# Patient Record
Sex: MALE | Race: WHITE | Employment: OTHER | URBAN - METROPOLITAN AREA
[De-identification: names, ages, dates, MRNs, and addresses within clinical notes are randomized per-mention and may not be internally consistent; named-entity substitution may affect disease eponyms.]

---

## 2018-11-09 ENCOUNTER — TELEPHONE (OUTPATIENT)
Dept: FAMILY MEDICINE CLINIC | Age: 58
End: 2018-11-09

## 2018-11-27 NOTE — TELEPHONE ENCOUNTER
Patient wife again called stating needs this form, patient hasnt been seen since 2015. States  had been diagnosed with Prostate Cancer. I tried to call the number listed and wasn't able to contact anyone by phone. I did leave a message stating that we havent seen this patient since 2015, and if a form needed signed they need to get from their current PCP. Patient was discharged on 10/29/2015.